# Patient Record
Sex: FEMALE | Race: WHITE | NOT HISPANIC OR LATINO
[De-identification: names, ages, dates, MRNs, and addresses within clinical notes are randomized per-mention and may not be internally consistent; named-entity substitution may affect disease eponyms.]

---

## 2018-05-12 ENCOUNTER — TRANSCRIPTION ENCOUNTER (OUTPATIENT)
Age: 48
End: 2018-05-12

## 2018-07-11 ENCOUNTER — TRANSCRIPTION ENCOUNTER (OUTPATIENT)
Age: 48
End: 2018-07-11

## 2020-04-30 ENCOUNTER — APPOINTMENT (OUTPATIENT)
Dept: INTERNAL MEDICINE | Facility: CLINIC | Age: 50
End: 2020-04-30

## 2020-04-30 ENCOUNTER — OUTPATIENT (OUTPATIENT)
Dept: OUTPATIENT SERVICES | Facility: HOSPITAL | Age: 50
LOS: 1 days | End: 2020-04-30
Payer: MEDICARE

## 2020-04-30 DIAGNOSIS — U07.1 COVID-19: ICD-10-CM

## 2020-04-30 PROCEDURE — G0463: CPT

## 2020-04-30 NOTE — HISTORY OF PRESENT ILLNESS
[Verbal consent obtained from patient] : the patient, [unfilled] [FreeTextEntry1] : 49F PMH of herpes s/p rx, fibroid s/p surgery presented for a telemedicine visit for cough. \par \par Patient reports that her symptoms started on 3/16 when she was having HA, feeling tired and having fever. Fever lasted for 4 days. Also n/v/unable to taste or smell, SOB. Patient went to the ED and was told to self-quaratined. She was tested positive for covid on 4/8. Her symptoms started to get better since 4/17. By 4/25, all symptoms resolved except for HA. She started having cough last night with a little bit of phlegm. Otherwise, she is ambulating without SOB, no fever/fatigue/chills and tolerating po well.\par \par PMH: herpes, fibroid \par Meds: none\par social: denies smoking, alcohol, drugs\par \par Assessment and plan:\par COVID-19 PNA\par -likely lingering symptoms of COVID PNA, bacterial PNA is lower on the differential at this time given no fever and otherwise feels well\par -recommended mucinex OTC for cough\par -recommended to call if symptoms worsen or has concerns\par \par d/w Dr. Adamson. [Time Spent: ___ minutes] : I have spent [unfilled] minutes with the patient on the telephone

## 2020-05-04 DIAGNOSIS — I10 ESSENTIAL (PRIMARY) HYPERTENSION: ICD-10-CM

## 2020-05-08 DIAGNOSIS — U07.1 COVID-19: ICD-10-CM

## 2021-04-05 ENCOUNTER — APPOINTMENT (OUTPATIENT)
Dept: DISASTER EMERGENCY | Facility: OTHER | Age: 51
End: 2021-04-05

## 2024-09-16 ENCOUNTER — NON-APPOINTMENT (OUTPATIENT)
Age: 54
End: 2024-09-16